# Patient Record
Sex: FEMALE | Race: WHITE | Employment: FULL TIME | URBAN - METROPOLITAN AREA
[De-identification: names, ages, dates, MRNs, and addresses within clinical notes are randomized per-mention and may not be internally consistent; named-entity substitution may affect disease eponyms.]

---

## 2023-09-06 LAB
HEP B, EXTERNAL RESULT: NEGATIVE
HIV, EXTERNAL RESULT: NORMAL
RUBELLA TITER, EXTERNAL RESULT: NORMAL

## 2024-01-26 LAB — T. PALLIDUM (SYPHILIS) ANTIBODY, EXTERNAL RESULT: NORMAL

## 2024-03-20 LAB — GBS, EXTERNAL RESULT: NEGATIVE

## 2024-04-07 ENCOUNTER — PREP FOR PROCEDURE (OUTPATIENT)
Dept: OBGYN | Age: 29
End: 2024-04-07

## 2024-04-07 RX ORDER — ONDANSETRON 2 MG/ML
4 INJECTION INTRAMUSCULAR; INTRAVENOUS EVERY 6 HOURS PRN
Status: CANCELLED | OUTPATIENT
Start: 2024-04-07

## 2024-04-07 RX ORDER — CARBOPROST TROMETHAMINE 250 UG/ML
250 INJECTION, SOLUTION INTRAMUSCULAR PRN
Status: CANCELLED | OUTPATIENT
Start: 2024-04-07

## 2024-04-07 RX ORDER — SODIUM CHLORIDE, SODIUM LACTATE, POTASSIUM CHLORIDE, AND CALCIUM CHLORIDE .6; .31; .03; .02 G/100ML; G/100ML; G/100ML; G/100ML
1000 INJECTION, SOLUTION INTRAVENOUS PRN
Status: CANCELLED | OUTPATIENT
Start: 2024-04-07

## 2024-04-07 RX ORDER — SODIUM CHLORIDE, SODIUM LACTATE, POTASSIUM CHLORIDE, AND CALCIUM CHLORIDE .6; .31; .03; .02 G/100ML; G/100ML; G/100ML; G/100ML
500 INJECTION, SOLUTION INTRAVENOUS PRN
Status: CANCELLED | OUTPATIENT
Start: 2024-04-07

## 2024-04-07 RX ORDER — ONDANSETRON 4 MG/1
4 TABLET, ORALLY DISINTEGRATING ORAL EVERY 6 HOURS PRN
Status: CANCELLED | OUTPATIENT
Start: 2024-04-07

## 2024-04-07 RX ORDER — SODIUM CHLORIDE, SODIUM LACTATE, POTASSIUM CHLORIDE, CALCIUM CHLORIDE 600; 310; 30; 20 MG/100ML; MG/100ML; MG/100ML; MG/100ML
INJECTION, SOLUTION INTRAVENOUS CONTINUOUS
Status: CANCELLED | OUTPATIENT
Start: 2024-04-07

## 2024-04-07 RX ORDER — METHYLERGONOVINE MALEATE 0.2 MG/ML
200 INJECTION INTRAVENOUS PRN
Status: CANCELLED | OUTPATIENT
Start: 2024-04-07

## 2024-04-07 RX ORDER — SODIUM CHLORIDE 0.9 % (FLUSH) 0.9 %
5-40 SYRINGE (ML) INJECTION EVERY 12 HOURS SCHEDULED
Status: CANCELLED | OUTPATIENT
Start: 2024-04-07

## 2024-04-07 RX ORDER — TERBUTALINE SULFATE 1 MG/ML
0.25 INJECTION, SOLUTION SUBCUTANEOUS
Status: CANCELLED | OUTPATIENT
Start: 2024-04-07 | End: 2024-04-08

## 2024-04-07 RX ORDER — SODIUM CHLORIDE 9 MG/ML
25 INJECTION, SOLUTION INTRAVENOUS PRN
Status: CANCELLED | OUTPATIENT
Start: 2024-04-07

## 2024-04-07 RX ORDER — SODIUM CHLORIDE 0.9 % (FLUSH) 0.9 %
5-40 SYRINGE (ML) INJECTION PRN
Status: CANCELLED | OUTPATIENT
Start: 2024-04-07

## 2024-04-07 RX ORDER — MISOPROSTOL 100 UG/1
400 TABLET ORAL PRN
Status: CANCELLED | OUTPATIENT
Start: 2024-04-07

## 2024-04-09 ENCOUNTER — ANESTHESIA (OUTPATIENT)
Dept: LABOR AND DELIVERY | Age: 29
End: 2024-04-09
Payer: COMMERCIAL

## 2024-04-09 ENCOUNTER — HOSPITAL ENCOUNTER (INPATIENT)
Age: 29
LOS: 4 days | Discharge: HOME | End: 2024-04-13
Attending: OBSTETRICS & GYNECOLOGY | Admitting: OBSTETRICS & GYNECOLOGY
Payer: COMMERCIAL

## 2024-04-09 ENCOUNTER — ANESTHESIA EVENT (OUTPATIENT)
Dept: LABOR AND DELIVERY | Age: 29
End: 2024-04-09
Payer: COMMERCIAL

## 2024-04-09 ENCOUNTER — APPOINTMENT (OUTPATIENT)
Dept: LABOR AND DELIVERY | Age: 29
End: 2024-04-09
Payer: COMMERCIAL

## 2024-04-09 DIAGNOSIS — G89.18 POST-OPERATIVE PAIN: Primary | ICD-10-CM

## 2024-04-09 PROBLEM — Z98.890 STATUS POST INDUCTION OF LABOR: Status: ACTIVE | Noted: 2024-04-09

## 2024-04-09 LAB
ABO + RH BLD: NORMAL
ABO, EXTERNAL RESULT: NORMAL
ALBUMIN SERPL-MCNC: 3.2 G/DL (ref 3.4–5)
ALBUMIN/GLOB SERPL: 1.4 {RATIO} (ref 1.1–2.2)
ALP SERPL-CCNC: 167 U/L (ref 40–129)
ALT SERPL-CCNC: 9 U/L (ref 10–40)
AMPHETAMINES UR QL SCN>1000 NG/ML: NORMAL
ANION GAP SERPL CALCULATED.3IONS-SCNC: 13 MMOL/L (ref 3–16)
APTT BLD: 25.1 SEC (ref 22.1–36.4)
AST SERPL-CCNC: 35 U/L (ref 15–37)
BACTERIA URNS QL MICRO: ABNORMAL /HPF
BARBITURATES UR QL SCN>200 NG/ML: NORMAL
BENZODIAZ UR QL SCN>200 NG/ML: NORMAL
BILIRUB SERPL-MCNC: <0.2 MG/DL (ref 0–1)
BILIRUB UR QL STRIP.AUTO: NEGATIVE
BLD GP AB SCN SERPL QL: NORMAL
BUN SERPL-MCNC: 9 MG/DL (ref 7–20)
BUPRENORPHINE+NOR UR QL SCN: NORMAL
CALCIUM SERPL-MCNC: 9.1 MG/DL (ref 8.3–10.6)
CANNABINOIDS UR QL SCN>50 NG/ML: NORMAL
CHLORIDE SERPL-SCNC: 103 MMOL/L (ref 99–110)
CLARITY UR: CLEAR
CO2 SERPL-SCNC: 18 MMOL/L (ref 21–32)
COCAINE UR QL SCN: NORMAL
COLOR UR: YELLOW
CREAT SERPL-MCNC: <0.5 MG/DL (ref 0.6–1.1)
DEPRECATED RDW RBC AUTO: 13.7 % (ref 12.4–15.4)
DRUG SCREEN COMMENT UR-IMP: NORMAL
EPI CELLS #/AREA URNS AUTO: 7 /HPF (ref 0–5)
FENTANYL SCREEN, URINE: NORMAL
FIBRINOGEN PPP-MCNC: 540 MG/DL (ref 227–534)
GFR SERPLBLD CREATININE-BSD FMLA CKD-EPI: >90 ML/MIN/{1.73_M2}
GLUCOSE SERPL-MCNC: 129 MG/DL (ref 70–99)
GLUCOSE UR STRIP.AUTO-MCNC: 100 MG/DL
HCT VFR BLD AUTO: 34.2 % (ref 36–48)
HGB BLD-MCNC: 11.8 G/DL (ref 12–16)
HGB UR QL STRIP.AUTO: NEGATIVE
HYALINE CASTS #/AREA URNS AUTO: 0 /LPF (ref 0–8)
INR PPP: 0.97 (ref 0.85–1.15)
KETONES UR STRIP.AUTO-MCNC: ABNORMAL MG/DL
LEUKOCYTE ESTERASE UR QL STRIP.AUTO: ABNORMAL
MCH RBC QN AUTO: 31.3 PG (ref 26–34)
MCHC RBC AUTO-ENTMCNC: 34.5 G/DL (ref 31–36)
MCV RBC AUTO: 90.7 FL (ref 80–100)
METHADONE UR QL SCN>300 NG/ML: NORMAL
NITRITE UR QL STRIP.AUTO: NEGATIVE
OPIATES UR QL SCN>300 NG/ML: NORMAL
OXYCODONE UR QL SCN: NORMAL
PCP UR QL SCN>25 NG/ML: NORMAL
PH UR STRIP.AUTO: 6.5 [PH] (ref 5–8)
PH UR STRIP: 6.5 [PH]
PLATELET # BLD AUTO: 197 K/UL (ref 135–450)
PMV BLD AUTO: 9.5 FL (ref 5–10.5)
POTASSIUM SERPL-SCNC: 5.1 MMOL/L (ref 3.5–5.1)
PROT SERPL-MCNC: 5.5 G/DL (ref 6.4–8.2)
PROT UR STRIP.AUTO-MCNC: ABNORMAL MG/DL
PROTHROMBIN TIME: 13.1 SEC (ref 11.9–14.9)
RBC # BLD AUTO: 3.77 M/UL (ref 4–5.2)
RBC CLUMPS #/AREA URNS AUTO: 2 /HPF (ref 0–4)
SODIUM SERPL-SCNC: 134 MMOL/L (ref 136–145)
SP GR UR STRIP.AUTO: 1.03 (ref 1–1.03)
UA DIPSTICK W REFLEX MICRO PNL UR: YES
URATE SERPL-MCNC: 4.9 MG/DL (ref 2.6–6)
URN SPEC COLLECT METH UR: ABNORMAL
UROBILINOGEN UR STRIP-ACNC: 1 E.U./DL
WBC # BLD AUTO: 9.4 K/UL (ref 4–11)
WBC #/AREA URNS AUTO: 5 /HPF (ref 0–5)

## 2024-04-09 PROCEDURE — 86780 TREPONEMA PALLIDUM: CPT

## 2024-04-09 PROCEDURE — 80307 DRUG TEST PRSMV CHEM ANLYZR: CPT

## 2024-04-09 PROCEDURE — 85730 THROMBOPLASTIN TIME PARTIAL: CPT

## 2024-04-09 PROCEDURE — 2580000003 HC RX 258: Performed by: OBSTETRICS & GYNECOLOGY

## 2024-04-09 PROCEDURE — 85384 FIBRINOGEN ACTIVITY: CPT

## 2024-04-09 PROCEDURE — 85027 COMPLETE CBC AUTOMATED: CPT

## 2024-04-09 PROCEDURE — 80053 COMPREHEN METABOLIC PANEL: CPT

## 2024-04-09 PROCEDURE — 1220000000 HC SEMI PRIVATE OB R&B

## 2024-04-09 PROCEDURE — 86850 RBC ANTIBODY SCREEN: CPT

## 2024-04-09 PROCEDURE — 6370000000 HC RX 637 (ALT 250 FOR IP): Performed by: OBSTETRICS & GYNECOLOGY

## 2024-04-09 PROCEDURE — 84550 ASSAY OF BLOOD/URIC ACID: CPT

## 2024-04-09 PROCEDURE — 59025 FETAL NON-STRESS TEST: CPT

## 2024-04-09 PROCEDURE — 86900 BLOOD TYPING SEROLOGIC ABO: CPT

## 2024-04-09 PROCEDURE — 86901 BLOOD TYPING SEROLOGIC RH(D): CPT

## 2024-04-09 PROCEDURE — 81001 URINALYSIS AUTO W/SCOPE: CPT

## 2024-04-09 PROCEDURE — 59200 INSERT CERVICAL DILATOR: CPT

## 2024-04-09 PROCEDURE — 85610 PROTHROMBIN TIME: CPT

## 2024-04-09 RX ORDER — SODIUM CHLORIDE 0.9 % (FLUSH) 0.9 %
5-40 SYRINGE (ML) INJECTION PRN
Status: DISCONTINUED | OUTPATIENT
Start: 2024-04-09 | End: 2024-04-13 | Stop reason: HOSPADM

## 2024-04-09 RX ORDER — METHYLERGONOVINE MALEATE 0.2 MG/ML
200 INJECTION INTRAVENOUS PRN
Status: DISCONTINUED | OUTPATIENT
Start: 2024-04-09 | End: 2024-04-13 | Stop reason: HOSPADM

## 2024-04-09 RX ORDER — ONDANSETRON 2 MG/ML
4 INJECTION INTRAMUSCULAR; INTRAVENOUS EVERY 6 HOURS PRN
Status: DISCONTINUED | OUTPATIENT
Start: 2024-04-09 | End: 2024-04-10 | Stop reason: ALTCHOICE

## 2024-04-09 RX ORDER — SODIUM CHLORIDE 9 MG/ML
25 INJECTION, SOLUTION INTRAVENOUS PRN
Status: DISCONTINUED | OUTPATIENT
Start: 2024-04-09 | End: 2024-04-13 | Stop reason: HOSPADM

## 2024-04-09 RX ORDER — SODIUM CHLORIDE, SODIUM LACTATE, POTASSIUM CHLORIDE, AND CALCIUM CHLORIDE .6; .31; .03; .02 G/100ML; G/100ML; G/100ML; G/100ML
1000 INJECTION, SOLUTION INTRAVENOUS PRN
Status: DISCONTINUED | OUTPATIENT
Start: 2024-04-09 | End: 2024-04-13 | Stop reason: HOSPADM

## 2024-04-09 RX ORDER — CARBOPROST TROMETHAMINE 250 UG/ML
250 INJECTION, SOLUTION INTRAMUSCULAR PRN
Status: DISCONTINUED | OUTPATIENT
Start: 2024-04-09 | End: 2024-04-13 | Stop reason: HOSPADM

## 2024-04-09 RX ORDER — TERBUTALINE SULFATE 1 MG/ML
0.25 INJECTION, SOLUTION SUBCUTANEOUS
Status: ACTIVE | OUTPATIENT
Start: 2024-04-09 | End: 2024-04-10

## 2024-04-09 RX ORDER — SODIUM CHLORIDE 0.9 % (FLUSH) 0.9 %
5-40 SYRINGE (ML) INJECTION EVERY 12 HOURS SCHEDULED
Status: DISCONTINUED | OUTPATIENT
Start: 2024-04-09 | End: 2024-04-13 | Stop reason: HOSPADM

## 2024-04-09 RX ORDER — ONDANSETRON 4 MG/1
4 TABLET, ORALLY DISINTEGRATING ORAL EVERY 6 HOURS PRN
Status: DISCONTINUED | OUTPATIENT
Start: 2024-04-09 | End: 2024-04-10 | Stop reason: ALTCHOICE

## 2024-04-09 RX ORDER — MISOPROSTOL 200 UG/1
400 TABLET ORAL PRN
Status: DISCONTINUED | OUTPATIENT
Start: 2024-04-09 | End: 2024-04-13 | Stop reason: HOSPADM

## 2024-04-09 RX ORDER — SODIUM CHLORIDE, SODIUM LACTATE, POTASSIUM CHLORIDE, AND CALCIUM CHLORIDE .6; .31; .03; .02 G/100ML; G/100ML; G/100ML; G/100ML
500 INJECTION, SOLUTION INTRAVENOUS PRN
Status: DISCONTINUED | OUTPATIENT
Start: 2024-04-09 | End: 2024-04-13 | Stop reason: HOSPADM

## 2024-04-09 RX ORDER — SODIUM CHLORIDE, SODIUM LACTATE, POTASSIUM CHLORIDE, CALCIUM CHLORIDE 600; 310; 30; 20 MG/100ML; MG/100ML; MG/100ML; MG/100ML
INJECTION, SOLUTION INTRAVENOUS CONTINUOUS
Status: DISCONTINUED | OUTPATIENT
Start: 2024-04-09 | End: 2024-04-10 | Stop reason: SDUPTHER

## 2024-04-09 RX ADMIN — Medication 25 MCG: at 20:05

## 2024-04-09 RX ADMIN — SODIUM CHLORIDE, POTASSIUM CHLORIDE, SODIUM LACTATE AND CALCIUM CHLORIDE: 600; 310; 30; 20 INJECTION, SOLUTION INTRAVENOUS at 19:38

## 2024-04-10 LAB — REAGIN+T PALLIDUM IGG+IGM SERPL-IMP: NORMAL

## 2024-04-10 PROCEDURE — 2500000003 HC RX 250 WO HCPCS: Performed by: NURSE ANESTHETIST, CERTIFIED REGISTERED

## 2024-04-10 PROCEDURE — 2709999900 HC NON-CHARGEABLE SUPPLY: Performed by: OBSTETRICS & GYNECOLOGY

## 2024-04-10 PROCEDURE — 7100000000 HC PACU RECOVERY - FIRST 15 MIN: Performed by: OBSTETRICS & GYNECOLOGY

## 2024-04-10 PROCEDURE — 59200 INSERT CERVICAL DILATOR: CPT

## 2024-04-10 PROCEDURE — 3700000000 HC ANESTHESIA ATTENDED CARE: Performed by: OBSTETRICS & GYNECOLOGY

## 2024-04-10 PROCEDURE — 7100000001 HC PACU RECOVERY - ADDTL 15 MIN: Performed by: OBSTETRICS & GYNECOLOGY

## 2024-04-10 PROCEDURE — 3700000001 HC ADD 15 MINUTES (ANESTHESIA): Performed by: OBSTETRICS & GYNECOLOGY

## 2024-04-10 PROCEDURE — 6370000000 HC RX 637 (ALT 250 FOR IP)

## 2024-04-10 PROCEDURE — 3700000025 EPIDURAL BLOCK: Performed by: ANESTHESIOLOGY

## 2024-04-10 PROCEDURE — 6360000002 HC RX W HCPCS: Performed by: NURSE ANESTHETIST, CERTIFIED REGISTERED

## 2024-04-10 PROCEDURE — 51701 INSERT BLADDER CATHETER: CPT

## 2024-04-10 PROCEDURE — 6360000002 HC RX W HCPCS

## 2024-04-10 PROCEDURE — 2580000003 HC RX 258: Performed by: NURSE ANESTHETIST, CERTIFIED REGISTERED

## 2024-04-10 PROCEDURE — 3E033VJ INTRODUCTION OF OTHER HORMONE INTO PERIPHERAL VEIN, PERCUTANEOUS APPROACH: ICD-10-PCS | Performed by: OBSTETRICS & GYNECOLOGY

## 2024-04-10 PROCEDURE — 2580000003 HC RX 258: Performed by: OBSTETRICS & GYNECOLOGY

## 2024-04-10 PROCEDURE — 51702 INSERT TEMP BLADDER CATH: CPT

## 2024-04-10 PROCEDURE — 3E0P7VZ INTRODUCTION OF HORMONE INTO FEMALE REPRODUCTIVE, VIA NATURAL OR ARTIFICIAL OPENING: ICD-10-PCS | Performed by: OBSTETRICS & GYNECOLOGY

## 2024-04-10 PROCEDURE — 6360000002 HC RX W HCPCS: Performed by: OBSTETRICS & GYNECOLOGY

## 2024-04-10 PROCEDURE — 2500000003 HC RX 250 WO HCPCS: Performed by: OBSTETRICS & GYNECOLOGY

## 2024-04-10 PROCEDURE — 3609079900 HC CESAREAN SECTION: Performed by: OBSTETRICS & GYNECOLOGY

## 2024-04-10 PROCEDURE — 1220000000 HC SEMI PRIVATE OB R&B

## 2024-04-10 PROCEDURE — 6370000000 HC RX 637 (ALT 250 FOR IP): Performed by: OBSTETRICS & GYNECOLOGY

## 2024-04-10 PROCEDURE — 2500000003 HC RX 250 WO HCPCS

## 2024-04-10 RX ORDER — MORPHINE SULFATE 1 MG/ML
INJECTION, SOLUTION EPIDURAL; INTRATHECAL; INTRAVENOUS PRN
Status: DISCONTINUED | OUTPATIENT
Start: 2024-04-10 | End: 2024-04-10 | Stop reason: SDUPTHER

## 2024-04-10 RX ORDER — ONDANSETRON 2 MG/ML
INJECTION INTRAMUSCULAR; INTRAVENOUS PRN
Status: DISCONTINUED | OUTPATIENT
Start: 2024-04-10 | End: 2024-04-10 | Stop reason: SDUPTHER

## 2024-04-10 RX ORDER — ACETAMINOPHEN 500 MG
1000 TABLET ORAL EVERY 8 HOURS PRN
Status: DISCONTINUED | OUTPATIENT
Start: 2024-04-10 | End: 2024-04-11

## 2024-04-10 RX ORDER — SODIUM CHLORIDE 0.9 % (FLUSH) 0.9 %
5-40 SYRINGE (ML) INJECTION PRN
Status: DISCONTINUED | OUTPATIENT
Start: 2024-04-10 | End: 2024-04-13 | Stop reason: HOSPADM

## 2024-04-10 RX ORDER — LIDOCAINE HYDROCHLORIDE AND EPINEPHRINE 15; 5 MG/ML; UG/ML
INJECTION, SOLUTION EPIDURAL PRN
Status: DISCONTINUED | OUTPATIENT
Start: 2024-04-10 | End: 2024-04-10 | Stop reason: SDUPTHER

## 2024-04-10 RX ORDER — OXYTOCIN 10 [USP'U]/ML
INJECTION, SOLUTION INTRAMUSCULAR; INTRAVENOUS PRN
Status: DISCONTINUED | OUTPATIENT
Start: 2024-04-10 | End: 2024-04-10 | Stop reason: SDUPTHER

## 2024-04-10 RX ORDER — ONDANSETRON 2 MG/ML
4 INJECTION INTRAMUSCULAR; INTRAVENOUS EVERY 6 HOURS PRN
Status: DISCONTINUED | OUTPATIENT
Start: 2024-04-10 | End: 2024-04-10 | Stop reason: SDUPTHER

## 2024-04-10 RX ORDER — BUPIVACAINE HYDROCHLORIDE 2.5 MG/ML
INJECTION, SOLUTION EPIDURAL; INFILTRATION; INTRACAUDAL PRN
Status: DISCONTINUED | OUTPATIENT
Start: 2024-04-10 | End: 2024-04-10 | Stop reason: SDUPTHER

## 2024-04-10 RX ORDER — CITRIC ACID/SODIUM CITRATE 334-500MG
SOLUTION, ORAL ORAL
Status: COMPLETED
Start: 2024-04-10 | End: 2024-04-10

## 2024-04-10 RX ORDER — MIDAZOLAM HYDROCHLORIDE 1 MG/ML
INJECTION INTRAMUSCULAR; INTRAVENOUS PRN
Status: DISCONTINUED | OUTPATIENT
Start: 2024-04-10 | End: 2024-04-10 | Stop reason: SDUPTHER

## 2024-04-10 RX ORDER — DIPHENHYDRAMINE HCL 25 MG
25 TABLET ORAL EVERY 6 HOURS PRN
Status: ACTIVE | OUTPATIENT
Start: 2024-04-10 | End: 2024-04-11

## 2024-04-10 RX ORDER — SODIUM CHLORIDE, SODIUM LACTATE, POTASSIUM CHLORIDE, CALCIUM CHLORIDE 600; 310; 30; 20 MG/100ML; MG/100ML; MG/100ML; MG/100ML
INJECTION, SOLUTION INTRAVENOUS CONTINUOUS PRN
Status: DISCONTINUED | OUTPATIENT
Start: 2024-04-10 | End: 2024-04-10 | Stop reason: SDUPTHER

## 2024-04-10 RX ORDER — ACETAMINOPHEN 500 MG
TABLET ORAL
Status: COMPLETED
Start: 2024-04-10 | End: 2024-04-10

## 2024-04-10 RX ORDER — ONDANSETRON 2 MG/ML
4 INJECTION INTRAMUSCULAR; INTRAVENOUS EVERY 6 HOURS PRN
Status: DISCONTINUED | OUTPATIENT
Start: 2024-04-10 | End: 2024-04-10

## 2024-04-10 RX ORDER — SODIUM CHLORIDE 0.9 % (FLUSH) 0.9 %
5-40 SYRINGE (ML) INJECTION EVERY 12 HOURS SCHEDULED
Status: DISCONTINUED | OUTPATIENT
Start: 2024-04-10 | End: 2024-04-13 | Stop reason: HOSPADM

## 2024-04-10 RX ORDER — DIPHENHYDRAMINE HYDROCHLORIDE 50 MG/ML
25 INJECTION INTRAMUSCULAR; INTRAVENOUS EVERY 6 HOURS PRN
Status: ACTIVE | OUTPATIENT
Start: 2024-04-10 | End: 2024-04-11

## 2024-04-10 RX ORDER — KETOROLAC TROMETHAMINE 30 MG/ML
30 INJECTION, SOLUTION INTRAMUSCULAR; INTRAVENOUS EVERY 6 HOURS
Status: DISCONTINUED | OUTPATIENT
Start: 2024-04-11 | End: 2024-04-10 | Stop reason: ALTCHOICE

## 2024-04-10 RX ORDER — LANOLIN 100 %
OINTMENT (GRAM) TOPICAL
Status: DISCONTINUED | OUTPATIENT
Start: 2024-04-10 | End: 2024-04-13 | Stop reason: HOSPADM

## 2024-04-10 RX ORDER — ONDANSETRON 2 MG/ML
4 INJECTION INTRAMUSCULAR; INTRAVENOUS EVERY 6 HOURS PRN
Status: DISCONTINUED | OUTPATIENT
Start: 2024-04-10 | End: 2024-04-13 | Stop reason: HOSPADM

## 2024-04-10 RX ORDER — LIDOCAINE HCL/EPINEPHRINE/PF 2%-1:200K
VIAL (ML) INJECTION PRN
Status: DISCONTINUED | OUTPATIENT
Start: 2024-04-10 | End: 2024-04-10 | Stop reason: SDUPTHER

## 2024-04-10 RX ORDER — SODIUM CHLORIDE 9 MG/ML
INJECTION, SOLUTION INTRAVENOUS PRN
Status: DISCONTINUED | OUTPATIENT
Start: 2024-04-10 | End: 2024-04-13 | Stop reason: HOSPADM

## 2024-04-10 RX ORDER — SODIUM CHLORIDE, SODIUM LACTATE, POTASSIUM CHLORIDE, CALCIUM CHLORIDE 600; 310; 30; 20 MG/100ML; MG/100ML; MG/100ML; MG/100ML
INJECTION, SOLUTION INTRAVENOUS CONTINUOUS
Status: DISCONTINUED | OUTPATIENT
Start: 2024-04-10 | End: 2024-04-13 | Stop reason: HOSPADM

## 2024-04-10 RX ORDER — NALOXONE HYDROCHLORIDE 0.4 MG/ML
INJECTION, SOLUTION INTRAMUSCULAR; INTRAVENOUS; SUBCUTANEOUS PRN
Status: ACTIVE | OUTPATIENT
Start: 2024-04-10 | End: 2024-04-11

## 2024-04-10 RX ORDER — IBUPROFEN 800 MG/1
800 TABLET ORAL EVERY 8 HOURS
Status: DISCONTINUED | OUTPATIENT
Start: 2024-04-12 | End: 2024-04-11

## 2024-04-10 RX ORDER — CITRIC ACID/SODIUM CITRATE 334-500MG
30 SOLUTION, ORAL ORAL ONCE
Status: COMPLETED | OUTPATIENT
Start: 2024-04-10 | End: 2024-04-10

## 2024-04-10 RX ORDER — LIDOCAINE HYDROCHLORIDE 10 MG/ML
INJECTION, SOLUTION INFILTRATION; PERINEURAL PRN
Status: DISCONTINUED | OUTPATIENT
Start: 2024-04-10 | End: 2024-04-10 | Stop reason: SDUPTHER

## 2024-04-10 RX ORDER — METOCLOPRAMIDE HYDROCHLORIDE 5 MG/ML
10 INJECTION INTRAMUSCULAR; INTRAVENOUS ONCE
Status: COMPLETED | OUTPATIENT
Start: 2024-04-10 | End: 2024-04-10

## 2024-04-10 RX ORDER — IBUPROFEN 800 MG/1
800 TABLET ORAL EVERY 8 HOURS
Status: DISCONTINUED | OUTPATIENT
Start: 2024-04-12 | End: 2024-04-10

## 2024-04-10 RX ORDER — OXYCODONE HYDROCHLORIDE 5 MG/1
5 TABLET ORAL EVERY 4 HOURS PRN
Status: DISCONTINUED | OUTPATIENT
Start: 2024-04-10 | End: 2024-04-13 | Stop reason: HOSPADM

## 2024-04-10 RX ORDER — ONDANSETRON 4 MG/1
4 TABLET, ORALLY DISINTEGRATING ORAL EVERY 8 HOURS PRN
Status: DISCONTINUED | OUTPATIENT
Start: 2024-04-10 | End: 2024-04-13 | Stop reason: HOSPADM

## 2024-04-10 RX ORDER — KETOROLAC TROMETHAMINE 30 MG/ML
30 INJECTION, SOLUTION INTRAMUSCULAR; INTRAVENOUS EVERY 6 HOURS
Status: DISCONTINUED | OUTPATIENT
Start: 2024-04-10 | End: 2024-04-10

## 2024-04-10 RX ORDER — ACETAMINOPHEN 500 MG
1000 TABLET ORAL ONCE
Status: COMPLETED | OUTPATIENT
Start: 2024-04-10 | End: 2024-04-10

## 2024-04-10 RX ORDER — FENTANYL CITRATE 50 UG/ML
INJECTION, SOLUTION INTRAMUSCULAR; INTRAVENOUS PRN
Status: DISCONTINUED | OUTPATIENT
Start: 2024-04-10 | End: 2024-04-10 | Stop reason: SDUPTHER

## 2024-04-10 RX ORDER — METOCLOPRAMIDE HYDROCHLORIDE 5 MG/ML
INJECTION INTRAMUSCULAR; INTRAVENOUS
Status: COMPLETED
Start: 2024-04-10 | End: 2024-04-10

## 2024-04-10 RX ORDER — FAMOTIDINE 10 MG/ML
INJECTION, SOLUTION INTRAVENOUS
Status: DISPENSED
Start: 2024-04-10 | End: 2024-04-11

## 2024-04-10 RX ORDER — KETOROLAC TROMETHAMINE 30 MG/ML
30 INJECTION, SOLUTION INTRAMUSCULAR; INTRAVENOUS EVERY 6 HOURS
Status: DISCONTINUED | OUTPATIENT
Start: 2024-04-11 | End: 2024-04-11

## 2024-04-10 RX ORDER — NALOXONE HYDROCHLORIDE 0.4 MG/ML
INJECTION, SOLUTION INTRAMUSCULAR; INTRAVENOUS; SUBCUTANEOUS PRN
Status: DISCONTINUED | OUTPATIENT
Start: 2024-04-10 | End: 2024-04-10 | Stop reason: ALTCHOICE

## 2024-04-10 RX ORDER — DOCUSATE SODIUM 100 MG/1
100 CAPSULE, LIQUID FILLED ORAL 2 TIMES DAILY
Status: DISCONTINUED | OUTPATIENT
Start: 2024-04-10 | End: 2024-04-13 | Stop reason: HOSPADM

## 2024-04-10 RX ORDER — SIMETHICONE 80 MG
80 TABLET,CHEWABLE ORAL EVERY 6 HOURS PRN
Status: DISCONTINUED | OUTPATIENT
Start: 2024-04-10 | End: 2024-04-13 | Stop reason: HOSPADM

## 2024-04-10 RX ADMIN — OXYTOCIN 3 ML: 10 INJECTION INTRAVENOUS at 17:55

## 2024-04-10 RX ADMIN — ONDANSETRON 4 MG: 2 INJECTION INTRAMUSCULAR; INTRAVENOUS at 17:40

## 2024-04-10 RX ADMIN — SODIUM CHLORIDE, POTASSIUM CHLORIDE, SODIUM LACTATE AND CALCIUM CHLORIDE: 600; 310; 30; 20 INJECTION, SOLUTION INTRAVENOUS at 15:50

## 2024-04-10 RX ADMIN — LIDOCAINE HYDROCHLORIDE,EPINEPHRINE BITARTRATE 3 ML: 20; .005 INJECTION, SOLUTION EPIDURAL; INFILTRATION; INTRACAUDAL; PERINEURAL at 17:40

## 2024-04-10 RX ADMIN — Medication 87.3 MILLI-UNITS/MIN: at 18:07

## 2024-04-10 RX ADMIN — LIDOCAINE HYDROCHLORIDE 3 ML: 10 INJECTION, SOLUTION INFILTRATION; PERINEURAL at 07:53

## 2024-04-10 RX ADMIN — Medication 1000 MG: at 17:01

## 2024-04-10 RX ADMIN — BUPIVACAINE HYDROCHLORIDE 5 ML: 2.5 INJECTION, SOLUTION EPIDURAL; INFILTRATION; INTRACAUDAL at 08:00

## 2024-04-10 RX ADMIN — Medication 25 MCG: at 04:50

## 2024-04-10 RX ADMIN — MORPHINE SULFATE 3 MG: 1 INJECTION, SOLUTION EPIDURAL; INTRATHECAL; INTRAVENOUS at 17:56

## 2024-04-10 RX ADMIN — Medication 25 MCG: at 00:15

## 2024-04-10 RX ADMIN — SODIUM CHLORIDE, POTASSIUM CHLORIDE, SODIUM LACTATE AND CALCIUM CHLORIDE: 600; 310; 30; 20 INJECTION, SOLUTION INTRAVENOUS at 18:07

## 2024-04-10 RX ADMIN — SODIUM CITRATE AND CITRIC ACID MONOHYDRATE 30 ML: 500; 334 SOLUTION ORAL at 17:01

## 2024-04-10 RX ADMIN — ACETAMINOPHEN 1000 MG: 500 TABLET ORAL at 17:01

## 2024-04-10 RX ADMIN — BUPIVACAINE HYDROCHLORIDE 3 ML: 2.5 INJECTION, SOLUTION EPIDURAL; INFILTRATION; INTRACAUDAL at 08:04

## 2024-04-10 RX ADMIN — LIDOCAINE HYDROCHLORIDE AND EPINEPHRINE 3 ML: 15; 5 INJECTION, SOLUTION EPIDURAL at 07:56

## 2024-04-10 RX ADMIN — BUPIVACAINE HYDROCHLORIDE 2 ML: 2.5 INJECTION, SOLUTION EPIDURAL; INFILTRATION; INTRACAUDAL at 08:08

## 2024-04-10 RX ADMIN — Medication 30 ML: at 17:01

## 2024-04-10 RX ADMIN — SODIUM CHLORIDE, SODIUM LACTATE, POTASSIUM CHLORIDE, AND CALCIUM CHLORIDE: .6; .31; .03; .02 INJECTION, SOLUTION INTRAVENOUS at 17:29

## 2024-04-10 RX ADMIN — FENTANYL CITRATE 50 MCG: 50 INJECTION INTRAMUSCULAR; INTRAVENOUS at 17:42

## 2024-04-10 RX ADMIN — METOCLOPRAMIDE 10 MG: 5 INJECTION, SOLUTION INTRAMUSCULAR; INTRAVENOUS at 17:02

## 2024-04-10 RX ADMIN — METOCLOPRAMIDE HYDROCHLORIDE 10 MG: 5 INJECTION INTRAMUSCULAR; INTRAVENOUS at 17:02

## 2024-04-10 RX ADMIN — SODIUM CHLORIDE 2000 MG: 900 INJECTION INTRAVENOUS at 17:02

## 2024-04-10 RX ADMIN — OXYTOCIN 20 ML: 10 INJECTION INTRAVENOUS at 17:28

## 2024-04-10 RX ADMIN — MIDAZOLAM 2 MG: 1 INJECTION INTRAMUSCULAR; INTRAVENOUS at 17:42

## 2024-04-10 RX ADMIN — Medication 20 MG: at 16:58

## 2024-04-10 RX ADMIN — LIDOCAINE HYDROCHLORIDE,EPINEPHRINE BITARTRATE 5 ML: 20; .005 INJECTION, SOLUTION EPIDURAL; INFILTRATION; INTRACAUDAL; PERINEURAL at 16:50

## 2024-04-10 RX ADMIN — LIDOCAINE HYDROCHLORIDE,EPINEPHRINE BITARTRATE 5 ML: 20; .005 INJECTION, SOLUTION EPIDURAL; INFILTRATION; INTRACAUDAL; PERINEURAL at 17:05

## 2024-04-10 RX ADMIN — LIDOCAINE HYDROCHLORIDE,EPINEPHRINE BITARTRATE 3 ML: 20; .005 INJECTION, SOLUTION EPIDURAL; INFILTRATION; INTRACAUDAL; PERINEURAL at 14:40

## 2024-04-10 RX ADMIN — Medication 1 MILLI-UNITS/MIN: at 08:51

## 2024-04-10 RX ADMIN — SODIUM CHLORIDE, POTASSIUM CHLORIDE, SODIUM LACTATE AND CALCIUM CHLORIDE: 600; 310; 30; 20 INJECTION, SOLUTION INTRAVENOUS at 09:15

## 2024-04-10 RX ADMIN — ONDANSETRON 4 MG: 2 INJECTION INTRAMUSCULAR; INTRAVENOUS at 17:05

## 2024-04-10 RX ADMIN — KETOROLAC TROMETHAMINE 30 MG: 30 INJECTION, SOLUTION INTRAMUSCULAR at 23:51

## 2024-04-10 ASSESSMENT — PAIN DESCRIPTION - LOCATION: LOCATION: ABDOMEN

## 2024-04-10 ASSESSMENT — PAIN SCALES - GENERAL
PAINLEVEL_OUTOF10: 0
PAINLEVEL_OUTOF10: 3

## 2024-04-10 ASSESSMENT — PAIN - FUNCTIONAL ASSESSMENT: PAIN_FUNCTIONAL_ASSESSMENT: ACTIVITIES ARE NOT PREVENTED

## 2024-04-10 ASSESSMENT — PAIN DESCRIPTION - DESCRIPTORS: DESCRIPTORS: SORE

## 2024-04-10 ASSESSMENT — PAIN DESCRIPTION - ORIENTATION: ORIENTATION: LOWER

## 2024-04-10 NOTE — FLOWSHEET NOTE
RN discussed placement of IUPC and FSE with Dr. Mena. MD to bedside. SVE performed and cervix noted to be 8/90/0. FSE and IUPC placed. RN notes FSE not functioning appropriately. FHR confirmed with external US at 1329 and noted to be at a baseline of ~130 BPM. FSE noted to be dislodged and was removed at 1333 and external US adjusted. External US noted to be tracing FHR appropriately at 1340 FHR baseline ~140 BPM.

## 2024-04-10 NOTE — H&P
Department of Obstetrics and Gynecology   Obstetrics History and Physical        CHIEF COMPLAINT:  induction of labor    HISTORY OF PRESENT ILLNESS:    Dara Medina  is a 28 y.o.  female at 39w3d is being admitted for induction    Estimated Due Date: Estimated Date of Delivery: 24    PRENATAL CARE: Complicated by: none    PAST OB HISTORY:  OB History          1    Para        Term                AB        Living             SAB        IAB        Ectopic        Molar        Multiple        Live Births                  Past Medical History:    History reviewed. No pertinent past medical history.  Past Surgical History:        Procedure Laterality Date    WISDOM TOOTH EXTRACTION       Allergies:  Patient has no known allergies.  Social History:    Social History     Socioeconomic History    Marital status:      Spouse name: Not on file    Number of children: Not on file    Years of education: Not on file    Highest education level: Not on file   Occupational History    Not on file   Tobacco Use    Smoking status: Never     Passive exposure: Never    Smokeless tobacco: Never   Vaping Use    Vaping Use: Not on file   Substance and Sexual Activity    Alcohol use: Not Currently    Drug use: Never    Sexual activity: Yes   Other Topics Concern    Not on file   Social History Narrative    Not on file     Social Determinants of Health     Financial Resource Strain: Not on file   Food Insecurity: No Food Insecurity (2024)    Hunger Vital Sign     Worried About Running Out of Food in the Last Year: Never true     Ran Out of Food in the Last Year: Never true   Transportation Needs: No Transportation Needs (2024)    PRAPARE - Transportation     Lack of Transportation (Medical): No     Lack of Transportation (Non-Medical): No   Physical Activity: Not on file   Stress: Not on file   Social Connections: Not on file   Intimate Partner Violence: Not on file   Housing Stability: Low Risk   proceed. The surgical site was confirmed by the patient and me.      Electronically signed by Nupur Mena MD on 4/10/2024 at 8:01 AM

## 2024-04-10 NOTE — FLOWSHEET NOTE
RN at bedside. SVE performed. FT/40. Pt tolerated well. Plan of care discussed w/ pt & FOB. Cytotec medication education given. All questions answered at this time. Pt up and ambulatory to bathroom w/o difficulty & back to bed. Cytotec placed 2005. Pt tolerated well. Call light and bedside table remain within reach.

## 2024-04-10 NOTE — ANESTHESIA PRE PROCEDURE
CMP: No results found for: \"NA\", \"K\", \"CL\", \"CO2\", \"BUN\", \"CREATININE\", \"GFRAA\", \"AGRATIO\", \"LABGLOM\", \"GLUCOSE\", \"GLU\", \"PROT\", \"CALCIUM\", \"BILITOT\", \"ALKPHOS\", \"AST\", \"ALT\"    POC Tests: No results for input(s): \"POCGLU\", \"POCNA\", \"POCK\", \"POCCL\", \"POCBUN\", \"POCHEMO\", \"POCHCT\" in the last 72 hours.    Coags:   Lab Results   Component Value Date/Time    PROTIME 13.1 04/09/2024 07:25 PM    INR 0.97 04/09/2024 07:25 PM    APTT 25.1 04/09/2024 07:25 PM       HCG (If Applicable): No results found for: \"PREGTESTUR\", \"PREGSERUM\", \"HCG\", \"HCGQUANT\"     ABGs: No results found for: \"PHART\", \"PO2ART\", \"PDX5LMS\", \"GCB0KET\", \"BEART\", \"T6CIZOTL\"     Type & Screen (If Applicable):  No results found for: \"LABABO\", \"LABRH\"    Drug/Infectious Status (If Applicable):  No results found for: \"HIV\", \"HEPCAB\"    COVID-19 Screening (If Applicable): No results found for: \"COVID19\"        Anesthesia Evaluation  Patient summary reviewed   no history of anesthetic complications:   Airway: Mallampati: I  TM distance: >3 FB   Neck ROM: full  Mouth opening: > = 3 FB   Dental: normal exam         Pulmonary:Negative Pulmonary ROS and normal exam                               Cardiovascular:Negative CV ROS  Exercise tolerance: good (>4 METS)          Rhythm: regular  Rate: normal           Beta Blocker:  Not on Beta Blocker         Neuro/Psych:   Negative Neuro/Psych ROS              GI/Hepatic/Renal: Neg GI/Hepatic/Renal ROS            Endo/Other: Negative Endo/Other ROS                    Abdominal:             Vascular: negative vascular ROS.         Other Findings:             Anesthesia Plan      epidural     ASA 2             Anesthetic plan and risks discussed with patient.    Use of blood products discussed with patient whom consented to blood products.                      ALESSANDRO EUGENE, APRN - CRNA   4/9/2024

## 2024-04-10 NOTE — FLOWSHEET NOTE
RN called [facility]. CRNA reported pt can have Toradol for pain management at 0000  Pt updated plan of care and agreeable.   RN remains in PACU w/ MOB

## 2024-04-10 NOTE — OP NOTE
Operative Note      Patient: Dara Medina  YOB: 1995  MRN: [id number]    Date of Procedure: 4/10/2024    Preop Diagnosis: 39 weeks, arrest of descent    Post-Op Diagnosis: Same       Procedure(s):  PRIMARY  SECTION WITH LOW TRANSVERSE UTERINE INCISION @ 1727    Surgeon(s):  Nupur Mena MD    Assistant:   * No surgical staff found *    Anesthesia: Epidural    Estimated Blood Loss (mL): 737    Complications: None    Specimens:   * No specimens in log *    Implants:  * No implants in log *      Drains:   Urinary Catheter 04/10/24 Vincent (Active)       Findings:  Infection Present At Time Of Surgery (PATOS) (choose all levels that have infection present):  No infection present  Other Findings: viable male, 8#3oz,     Detailed Description of Procedure:     Indications: Patient is a 28 y.o.  female at 39w3d who presented for IOL. Cytotec, pitocin. Progressed to complete. No descent past 0 station. Decels in fetal heart tracing.    The risks, benefits and alternatives of  section were discussed with the patient, including but not limited to infection, allergic reaction, disfiguring scar, thrombosis, injury to bowel, bladder, fistula, injury to blood vessels, hysterectomy, injury to fetus, need for blood transfusion which carries risk for HIV or hepatitis, pelvic pain, adhesive disease or scar tissue, embolism, herniation of incision site, and risk of repeat cesareans / trial of labor after . Patient elected to proceed, informed consent was signed.    Procedure Details:  The patient was taken to the operating room, placed in the supine position with the leftward tilt. Epidural anesthesia was found to be adequate. Vincent catheter was in place from labor. The patient was prepped and draped in the normal sterile fashion. Time out was performed, identifying correct patients name, date of birth, and type of the procedure.      Pfannenstiel skin incision was made with the  and the patient was taken to recovery in stable condition.      Electronically signed by Nupur Mena MD on 4/10/2024 at 6:02 PM

## 2024-04-10 NOTE — FLOWSHEET NOTE
Call placed to make Dr. Mena aware for Pt request for epidural at this time. Per MD Pt may have epidural.

## 2024-04-10 NOTE — FLOWSHEET NOTE
04/10/24 1248   Fetal Heart Rate   Interventions RN at Bedside;Provider at Bedside;Sterile Vaginal Exam Performed   Cervical Exam   Dilation (cm) 7   Effacement 80   Cervical Characteristics Mid-Position   Station -1   Station (Labor Curve Graph) 6   Presentation Vertex   OB Examiner Dr. Trina Mena aware of decelerations in FHR. RN repositioned Pt and IV fluid bolus administered. SVE performed per Dr. Mena and MD monitored fetal monitoring strip at bedside during that time. No changes to plan of care at this time.

## 2024-04-10 NOTE — FLOWSHEET NOTE
Pt admitted to [facility] for IOL. Pt and family oriented to room, call light, and binder. Whiteboard updated, gown and urine collection container given. Pt aware of urine drug testing and signed written consent. EFM applied with consent to central monitor bank with alarms on. Uterus soft and non-tender. Pt reports fetal movement. Pt denies vaginal leakage of fluid or bleeding. IV started, infusing w/o difficulty, labs sent. Admission history obtained and appropriate consents reviewed and signed. Call light and bedside table placed within reach. Instructed to call with any questions, needs, or concerns.

## 2024-04-10 NOTE — ANESTHESIA PROCEDURE NOTES
Epidural Block    Patient location during procedure: OB  Start time: 4/10/2024 7:56 AM  End time: 4/10/2024 8:10 AM  Reason for block: labor epidural  Staffing  Resident/CRNA: Edward Du APRN - CRNA  Performed by: Edward Du APRN - CRNA  Authorized by: Yogi Negro MD    Epidural  Patient position: sitting  Prep: ChloraPrep and site prepped and draped  Patient monitoring: continuous pulse ox and frequent blood pressure checks  Approach: midline  Location: L3-4  Guidance: paresthesia technique  Provider prep: mask and sterile gloves  Needle  Needle type: Tuohy   Needle gauge: 17 G  Needle length: 3.5 in  Needle insertion depth: 5 cm  Catheter type: side hole  Catheter size: 19 G  Catheter at skin depth: 11 cm  Test dose: negativeCatheter Secured: tegaderm and tape  Assessment  Sensory level: T8  Hemodynamics: stable  Attempts: 1  Outcomes: uncomplicated and patient tolerated procedure well  Preanesthetic Checklist  Completed: patient identified, IV checked, site marked, risks and benefits discussed, surgical/procedural consents, equipment checked, pre-op evaluation, timeout performed, anesthesia consent given, oxygen available, monitors applied/VS acknowledged, fire risk safety assessment completed and verbalized and blood product R/B/A discussed and consented

## 2024-04-10 NOTE — FLOWSHEET NOTE
Cytotec tubed up to unit. RN at bedside. 3rd Cytotec placed. Pt tolerated well. All questions answered at this time. Call light and bedside table remain within reach. Pt w/o needs at this time.

## 2024-04-10 NOTE — PLAN OF CARE
Problem: Vaginal Birth or  Section  Goal: Fetal and maternal status remain reassuring during the birth process  Description:  Birth OB-Pregnancy care plan goal which identifies if the fetal and maternal status remain reassuring during the birth process  Outcome: Progressing     Problem: Pain  Goal: Verbalizes/displays adequate comfort level or baseline comfort level  Recent Flowsheet Documentation  Taken 2024 2030 by Victorina Pike RN  Verbalizes/displays adequate comfort level or baseline comfort level: Encourage patient to monitor pain and request assistance

## 2024-04-10 NOTE — FLOWSHEET NOTE
04/10/24 1434   Pain Assessment   Pain Assessment 0-10   Pain Level 3   Patient's Stated Pain Goal 0 - No pain   Pain Location Abdomen;Pelvis   Pain Orientation Right   Pain Descriptors Aching;Cramping;Discomfort   Functional Pain Assessment Activities are not prevented   Pain Type Acute pain   Pain Frequency Intermittent   Pain Onset On-going   Non-Pharmaceutical Pain Intervention(s) Emotional support;Family support;Repositioned     Call placed to VERONICA Du CRNA to make aware of Pt request for pain relief. CRNA en route to bedside.

## 2024-04-10 NOTE — FLOWSHEET NOTE
Pt in OR at this time for unscheduled primary  section per Dr. Mena. This RN is to circulate during procedure.

## 2024-04-10 NOTE — FLOWSHEET NOTE
RN notes repetitive decelerations in FHR. RN to bedside and SVE performed at 1512, Pt cervix is 10/100/0. Call placed to Dr. Mena at 1518 to make aware of Pt complete with noted caput. Pt laboring down on a left tilt with peanut ball under her right hip at this time.

## 2024-04-10 NOTE — FLOWSHEET NOTE
Report received from Hilda MATA. This RN to assume care at this time. Pt reports 0/10 pain. Fundus remains firm u/u with scant bleeding at this time. Mom & Baby bonding well & skin to skin continues. RN remains in PACU with pt.

## 2024-04-10 NOTE — FLOWSHEET NOTE
Decision for primary  section per Dr. Mena for arrest of descent. MD at bedside discussing procedure with Pt.

## 2024-04-10 NOTE — FLOWSHEET NOTE
Call placed to VERONICA Du CRNA at 0734 to make aware of Pt wanting epidural for C/O 6/10 pain with contractions. CRNA en route to bedside. Pt up to the restroom to void and IV fluid bolus started. At 0740 Pt experienced a large gush of clear fluids. Pt assisted with marcio care and assisted back to the side of the bed. RN remains at Pt bedside. At 0752 Pt set up on the side of the bed for epidural insertion, CRNA at bedside. Epidural catheter placed and test dose performed at 0757. Epidural catheter secured and Pt assisted onto a right tilt at 0804.

## 2024-04-10 NOTE — ANESTHESIA POSTPROCEDURE EVALUATION
Department of Anesthesiology  Postprocedure Note    Patient: Dara Medina  MRN: [id number]  YOB: 1995  Date of evaluation: 4/10/2024    Procedure Summary       Date: 04/10/24 Room / Location:     Anesthesia Start: 0756 Anesthesia Stop: 1800    Procedure: Labor Analgesia Diagnosis:     Scheduled Providers:  Responsible Provider: Holly Oakes MD    Anesthesia Type: epidural ASA Status: 2            Anesthesia Type: No value filed.    Omnika Phase I:      Monika Phase II:      Anesthesia Post Evaluation    Patient location during evaluation: PACU  Patient participation: complete - patient participated  Level of consciousness: awake and alert  Pain score: 0  Nausea & Vomiting: no nausea and no vomiting  Cardiovascular status: hemodynamically stable  Respiratory status: acceptable  Hydration status: stable  Pain management: adequate        No notable events documented.

## 2024-04-10 NOTE — FLOWSHEET NOTE
RN at bedside to introduce self to Pt and FOB. Pt & FOB updated on POC and all questions answered. Pt states that she is coping through her contractions well at this time, RN notes Pt breathing well through contractions and is able to rest in-between contractions.  Pt states that she may want her epidural prior to rupture of membranes, RN will update Dr. Mena on Pt request upon arrival to unit. Pt denies any additional needs at this time.

## 2024-04-10 NOTE — FLOWSHEET NOTE
Procedure consent signed, witnessed and verified. Pre-op medications administered as ordered. Hair clipped and skin cleansed at incision site with chlorhexidine wipes. IUPC removed. Pt to OR at this time. All Pt questions answered. RN remains at bedside and is to circulate during procedure.

## 2024-04-10 NOTE — FLOWSHEET NOTE
RN at bedside. Pt ambulatory to bathroom & voided w/o difficulties & able to ambulate back to bed. NATALIIAE performed pt FT/60/-3. Plan of care discussed. Second dose of ordered Cytotec placed. Pt tolerated well. All questions answered at this time. Call light and bedside table  placed within reach. Instructed to call with any questions, needs, or concerns.

## 2024-04-10 NOTE — FLOWSHEET NOTE
04/09/24 1930   Fetal Heart Rate   Mode External US   Baseline Rate 135 bpm   Baseline Classification Normal   Variability 6-25 BPM   Pattern Accelerations   Patient Feels Fetal Movement Yes   Interventions Ultrasound Adjusted;[place] Adjusted;RN at Bedside   OB Bladder Status Non-distended;Voiding   OB Bladder Intervention Voiding with Relief   Multiple birth? No   Fetal Monitoring Strip   FMS Reviewed? Yes   FMS Reviewed By? sp   Uterine Activity   UA Mode Palpation;[place]   Contraction Frequency N/A   Contraction Duration N/A   Contraction Intensity N/A   Resting Tone Palpated Soft

## 2024-04-10 NOTE — FLOWSHEET NOTE
RN at bedside. SVE performed. 1/60/-3  Pt tolerated well. Pt up and ambulatory to bathroom and voided w/o difficulties. Pt ambulatory back to bed. Call light and bedside table remain within reach   [facility] on unit out of stock with Cytotec. Pharmacy called & notified by RN. RN told Pre-Split Cytotec will be tubed up to unit as soon as possible. Pt notified of plan of care. All questions answered at this time.

## 2024-04-11 ENCOUNTER — ANESTHESIA EVENT (OUTPATIENT)
Dept: LABOR AND DELIVERY | Age: 29
End: 2024-04-11
Payer: COMMERCIAL

## 2024-04-11 LAB
DEPRECATED RDW RBC AUTO: 13.6 % (ref 12.4–15.4)
HCT VFR BLD AUTO: 31.1 % (ref 36–48)
HGB BLD-MCNC: 10.3 G/DL (ref 12–16)
MCH RBC QN AUTO: 30.1 PG (ref 26–34)
MCHC RBC AUTO-ENTMCNC: 33.1 G/DL (ref 31–36)
MCV RBC AUTO: 91.1 FL (ref 80–100)
PLATELET # BLD AUTO: 157 K/UL (ref 135–450)
PMV BLD AUTO: 9.1 FL (ref 5–10.5)
RBC # BLD AUTO: 3.41 M/UL (ref 4–5.2)
WBC # BLD AUTO: 13.9 K/UL (ref 4–11)

## 2024-04-11 PROCEDURE — 6360000002 HC RX W HCPCS: Performed by: OBSTETRICS & GYNECOLOGY

## 2024-04-11 PROCEDURE — 1220000000 HC SEMI PRIVATE OB R&B

## 2024-04-11 PROCEDURE — 6370000000 HC RX 637 (ALT 250 FOR IP): Performed by: OBSTETRICS & GYNECOLOGY

## 2024-04-11 PROCEDURE — 85027 COMPLETE CBC AUTOMATED: CPT

## 2024-04-11 PROCEDURE — 36415 COLL VENOUS BLD VENIPUNCTURE: CPT

## 2024-04-11 RX ORDER — IBUPROFEN 800 MG/1
800 TABLET ORAL EVERY 8 HOURS
Status: DISCONTINUED | OUTPATIENT
Start: 2024-04-11 | End: 2024-04-13 | Stop reason: HOSPADM

## 2024-04-11 RX ORDER — IBUPROFEN 800 MG/1
800 TABLET ORAL EVERY 8 HOURS
Status: DISCONTINUED | OUTPATIENT
Start: 2024-04-12 | End: 2024-04-11

## 2024-04-11 RX ORDER — KETOROLAC TROMETHAMINE 30 MG/ML
30 INJECTION, SOLUTION INTRAMUSCULAR; INTRAVENOUS EVERY 8 HOURS
Status: DISPENSED | OUTPATIENT
Start: 2024-04-11 | End: 2024-04-12

## 2024-04-11 RX ORDER — ACETAMINOPHEN 500 MG
1000 TABLET ORAL EVERY 8 HOURS
Status: DISCONTINUED | OUTPATIENT
Start: 2024-04-12 | End: 2024-04-13 | Stop reason: HOSPADM

## 2024-04-11 RX ADMIN — KETOROLAC TROMETHAMINE 30 MG: 30 INJECTION, SOLUTION INTRAMUSCULAR at 05:42

## 2024-04-11 RX ADMIN — DOCUSATE SODIUM 100 MG: 100 CAPSULE, LIQUID FILLED ORAL at 10:48

## 2024-04-11 RX ADMIN — DOCUSATE SODIUM 100 MG: 100 CAPSULE, LIQUID FILLED ORAL at 20:25

## 2024-04-11 RX ADMIN — IBUPROFEN 800 MG: 800 TABLET, FILM COATED ORAL at 21:10

## 2024-04-11 RX ADMIN — KETOROLAC TROMETHAMINE 30 MG: 30 INJECTION, SOLUTION INTRAMUSCULAR at 13:10

## 2024-04-11 ASSESSMENT — PAIN SCALES - GENERAL
PAINLEVEL_OUTOF10: 0
PAINLEVEL_OUTOF10: 2
PAINLEVEL_OUTOF10: 2
PAINLEVEL_OUTOF10: 0
PAINLEVEL_OUTOF10: 3
PAINLEVEL_OUTOF10: 2

## 2024-04-11 ASSESSMENT — PAIN DESCRIPTION - ORIENTATION
ORIENTATION: LOWER

## 2024-04-11 ASSESSMENT — PAIN DESCRIPTION - LOCATION
LOCATION: ABDOMEN;INCISION
LOCATION: ABDOMEN

## 2024-04-11 ASSESSMENT — PAIN DESCRIPTION - FREQUENCY
FREQUENCY: CONTINUOUS

## 2024-04-11 ASSESSMENT — PAIN DESCRIPTION - DESCRIPTORS
DESCRIPTORS: SORE
DESCRIPTORS: ACHING;DISCOMFORT

## 2024-04-11 ASSESSMENT — PAIN DESCRIPTION - PAIN TYPE
TYPE: SURGICAL PAIN

## 2024-04-11 ASSESSMENT — PAIN DESCRIPTION - ONSET
ONSET: ON-GOING

## 2024-04-11 ASSESSMENT — PAIN - FUNCTIONAL ASSESSMENT
PAIN_FUNCTIONAL_ASSESSMENT: ACTIVITIES ARE NOT PREVENTED

## 2024-04-11 NOTE — PROGRESS NOTES
Department of Obstetrics and Gynecology   Postpartum Rounds    SUBJECTIVE:  Pain is controlled with non-steroidal anti-inflammatory drugs or narcotic analgesics. The patient is tolerating regular diet. She is ambulating. Her lochia is normal.    OBJECTIVE:  Vital Signs: /77   Pulse 82   Temp 98 °F (36.7 °C) (Oral)   Resp 16   Ht 1.588 m (5' 2.5\")   Wt 94.3 kg (208 lb)   SpO2 95%   Breastfeeding Unknown   BMI 37.44 kg/m²   Appearance/Psychiatric: awake, alert, cooperative, no apparent distress, appears stated age  Constitutional: The patient is well nourished.  Cardiovascular: She does not have edema.  Respiratory: Respiratory effort is normal.  Gastrointestinal: Soft, appropriately tender, uterine fundus is firm below umbilicus  The incision is covered with clean silver dressing  Extremities: nontender to palpation    LABS / IMAGING:  CBC:   Lab Results   Component Value Date/Time    WBC 13.9 2024 08:42 AM    RBC 3.41 2024 08:42 AM    HGB 10.3 2024 08:42 AM    HCT 31.1 2024 08:42 AM    MCV 91.1 2024 08:42 AM    MCH 30.1 2024 08:42 AM    MCHC 33.1 2024 08:42 AM    RDW 13.6 2024 08:42 AM     2024 08:42 AM    MPV 9.1 2024 08:42 AM       ASSESSMENT:    Postoperative Day 1 s/p  Unplanned PLTCS for arrest of descent      PLAN:   1. Advance postoperative care as tolerated  2. Male infant, breast feeding.  3. Discharge home on Postpartum Day 3  4. Return to office in 6 weeks   5. Postpartum instructions reviewed and all patient's Questions answered    Electronically signed by Lee Ann Baum MD on 2024 at 2:02 PM

## 2024-04-11 NOTE — FLOWSHEET NOTE
FOB/ Barry has severe diagnosed anxiety. RN notes Barry to be incongruently anxious regarding both maternal & fetal care and general well- being, continually asking \" Is (he/she) okay? Is (he/she) going to die?\" RN placed a social work consult so that Pt & FOB may have additonal support and availability to resources if needed when they return home.

## 2024-04-11 NOTE — PLAN OF CARE
Problem: Vaginal Birth or  Section  Goal: Fetal and maternal status remain reassuring during the birth process  Description:  Birth OB-Pregnancy care plan goal which identifies if the fetal and maternal status remain reassuring during the birth process  Outcome: Completed     Problem: Pain  Goal: Verbalizes/displays adequate comfort level or baseline comfort level  Outcome: Progressing  Flowsheets  Taken 2024 0355  Verbalizes/displays adequate comfort level or baseline comfort level: Encourage patient to monitor pain and request assistance  Taken 4/10/2024 2351  Verbalizes/displays adequate comfort level or baseline comfort level: Encourage patient to monitor pain and request assistance  Taken 4/10/2024 2000  Verbalizes/displays adequate comfort level or baseline comfort level: Encourage patient to monitor pain and request assistance     Problem: Safety - Adult  Goal: Free from fall injury  Outcome: Progressing     Problem: Discharge Planning  Goal: Discharge to home or other facility with appropriate resources  Outcome: Progressing

## 2024-04-11 NOTE — ANESTHESIA POSTPROCEDURE EVALUATION
Department of Anesthesiology  Postprocedure Note    Patient: Dara Medina  MRN: [id number]  YOB: 1995  Date of evaluation: 2024    Procedure Summary       Date: 04/10/24 Room / Location: [facility]    Anesthesia Start: 0756 Anesthesia Stop: 1800    Procedures:       PRIMARY  SECTION WITH LOW TRANSVERSE UTERINE INCISION @ 1727 (Abdomen/Perineum)      Labor Analgesia Diagnosis: (Arrest of Descent)    Surgeons: Nupur Mena MD Responsible Provider: Holly Oakes MD    Anesthesia Type: epidural ASA Status: 2            Anesthesia Type: Epidural    Monika Phase I: Monika Score: 10    Monika Phase II: Monika Score: 9    Anesthesia Post Evaluation    Patient location during evaluation: bedside  Patient participation: complete - patient participated  Level of consciousness: awake and alert  Pain score: 2  Airway patency: patent  Nausea & Vomiting: no nausea and no vomiting  Cardiovascular status: hemodynamically stable  Respiratory status: room air  Hydration status: stable  Multimodal analgesia pain management approach (Tylenol/Motrin)  Pain management: adequate and satisfactory to patient      [facility] Department of Anesthesiology  Post-Anesthesia Note       Name:  Dara Medina                                         Age:  28 y.o.  MRN:  [id number]   39w3d      Dara Medina was evaluated on postpartum day 1.  She expresses no concerns regarding her anesthesia care at this time.        Last Vitals & Oxygen Saturation: /77   Pulse 82   Temp 36.7 °C (98 °F) (Oral)   Resp 16   Ht 1.588 m (5' 2.5\")   Wt 94.3 kg (208 lb)   SpO2 95%   Breastfeeding Unknown   BMI 37.44 kg/m²       Anesthesia: epidural    Level of consciousness: awake, alert, and oriented    Respiratory: stable     Cardiovascular: stable     Hydration: stable     PONV:  none    Pruritis: none    Post Dural Puncture Headache: denies    Post-op pain: adequate analgesia    Out of bed

## 2024-04-11 NOTE — FLOWSHEET NOTE
RN at bedside. Marcio care performed. Vincent cath removed. 500ml of micah yellow urine drained. Ice pack, marcio pad & panties. New gown brought to bedside- pt declined need for new gown. Gown left in pt's bathroom. Pt up and ambulated with stand by assist to chair. Pt tolerated well. Morning dose of Toradol given. Abdominal binder offered & at bedside- pt declined application at this time. New chux pad and blanket applied to pt's bed. Call light and bedside table placed within reach. Instructed to call with any questions, needs, or concerns.

## 2024-04-11 NOTE — LACTATION NOTE
This note was copied from a baby's chart.  Lactation Consult Note    Data: Consult received and appreciated. KASHMIR reviewed chart and spoke with Farida MATA.    Maternal History: denies    OB/Delivery Risks for Lactation: IOL, first baby, few elevated b/p's since birth    Breast pump for home use: Spectra S2    Action: LC to room. Introduced self and lactation services. Mother agreeable to consult at this time.  Mother resting in bed. Infant sleeping, swaddled in father's arms, showing no hunger cues at this time. Mother states it has been 3 hours since infant last fed, agreeable to LC assist with feeding session.  LC performed initial  assessment, including oral exam. Infant did not seem interested in sucking on LC's gloved finger. When LC explained this to parents, dad asked \"Is he going to die?\"  LC explained normal  behaviors and instincts and that stimulating his palate should elicit a sucking response. With continued stimulation, infant did initiate sucking but was disorganized at first. After a short sucking burst, his suck did become stronger but he also pushed LC's gloved finger out of his mouth.  When asked, dad shared he was diagnosed with anxiety and takes Prozac for it. Dad shared he needs to take his morning dose so LC encouraged him to do so at this time. D/w parents that infants may feel anxious from parents so encouraged them to maintain a calming environment/presence for him.   Infant placed skin to skin with mother and he immediately calmed. LC reviewed Care Plan for First 24 Hours of Life.  Discussed recognizing hunger cues and offering the breast when cues are shown. Encouraged breastfeeding on demand and attempting/offering at least every 3 hours. Encouraged unlimited skin to skin contact with infant and reviewed benefits including better temperature, heart rate, respiration, blood pressure, and blood sugar regulation. Also increased bonding and milk supply associated with skin to  skin contact.   Reviewed feeding positions and latch on techniques. Encouraged lining nose to nipple, holding belly to belly and waiting for wide open mouth before quickly bringing infant onto breast. Reviewed importance of obtaining a deep latch to ensure milk transfer, establish milk production, maintain milk supply and improve maternal comfort.  Reviewed  signs of milk transfer, output goals and normal feeding/sleeping behaviors for the first 24 hours and beyond.    With mother's permission, I performed a breast exam and found normal anatomy and sufficient glandular tissue for breastfeeding.  Nipples are flat, but protrude with stimulation. Nipples retract with compression so taught mother corner latch.  Valencia Pham at bedside at this time for  assessment.  Infant placed back in skin to skin contact when he was finished and he began showing hunger cues.  LC repositioned to football hold on left side. LC easily hand expressed many drops of colostrum to coax infant to latch. He would latch but not suck, fall asleep, then pull off fussy. He did this for about 10-15 minutes before stopping to take a break and he was placed back on mom's chest for skin contact.  Mother then tried to latch him to right side in football hold. He was very fussy and did not attempt to latch on despite mother hand expressing drops of colostrum to him. Infant fussy and gassy and passing gas so attempt was stopped and he was placed back in skin contact for calming. He did not latch on to pacifier either when it was offered several different times. D/w parents he does not seem hungry at this time and may be fussy due to gas. Reassurance provided.   Reviewed feeding plan/goals. D/w mother when to supplement and when to pump, etc.   LC encouraged unlimited skin contact and attempting when infant shows cues for now. If mother decides to supplement for any reason, encouraged the initiation of pumping and explained rationale. Answered many

## 2024-04-11 NOTE — FLOWSHEET NOTE
Recovery ended, patient doing well. VSS. Fundus remains firm, bleeding WNL. Incision site CDI. Vincent draining clear yellow urine.  IV infusing without trouble.  Hetal care performed, pads and gown changed. SCD's remain on. Pt moved to room 2257. Postpartum education given, whiteboard updated, call light at bedside. IS placed at bedside-education given and demonstrated back to RN.  remains at bedside. Pt without needs at this time.Instructed to call with any questions, needs, or concerns.

## 2024-04-11 NOTE — FLOWSHEET NOTE
Morning assessment completed at bedside. VSS. Fundus firm at midline.  Pt with minimal bleeding.  Incision site CD&I  Pt eating & drinking well on own. Vincent remains in place.  Pain controlled at this time with Scheduled & PRN pain meds. Safe sleep & routine postpartum care reviewed with MOB & FOB.   Mom without questions at this time. Discussed plan of care with pt and FOB. Educated and encouraged skin to skin when attempting to breastfeed. Call light & bedside table within reach. No needs at this time. Encouraged pt to call with any needs.

## 2024-04-12 PROBLEM — Z98.890 STATUS POST INDUCTION OF LABOR: Status: RESOLVED | Noted: 2024-04-09 | Resolved: 2024-04-12

## 2024-04-12 PROCEDURE — 6370000000 HC RX 637 (ALT 250 FOR IP): Performed by: OBSTETRICS & GYNECOLOGY

## 2024-04-12 PROCEDURE — 6370000000 HC RX 637 (ALT 250 FOR IP): Performed by: ANESTHESIOLOGY

## 2024-04-12 PROCEDURE — 1220000000 HC SEMI PRIVATE OB R&B

## 2024-04-12 RX ORDER — OXYCODONE HYDROCHLORIDE 5 MG/1
5 TABLET ORAL EVERY 6 HOURS PRN
Qty: 10 TABLET | Refills: 0 | Status: SHIPPED | OUTPATIENT
Start: 2024-04-12 | End: 2024-04-19

## 2024-04-12 RX ADMIN — DOCUSATE SODIUM 100 MG: 100 CAPSULE, LIQUID FILLED ORAL at 20:15

## 2024-04-12 RX ADMIN — DOCUSATE SODIUM 100 MG: 100 CAPSULE, LIQUID FILLED ORAL at 10:39

## 2024-04-12 RX ADMIN — IBUPROFEN 800 MG: 800 TABLET, FILM COATED ORAL at 15:01

## 2024-04-12 RX ADMIN — ACETAMINOPHEN 1000 MG: 500 TABLET ORAL at 18:03

## 2024-04-12 RX ADMIN — ACETAMINOPHEN 1000 MG: 500 TABLET ORAL at 10:38

## 2024-04-12 RX ADMIN — ACETAMINOPHEN 1000 MG: 500 TABLET ORAL at 00:18

## 2024-04-12 ASSESSMENT — PAIN DESCRIPTION - ORIENTATION
ORIENTATION: LOWER

## 2024-04-12 ASSESSMENT — PAIN - FUNCTIONAL ASSESSMENT
PAIN_FUNCTIONAL_ASSESSMENT: ACTIVITIES ARE NOT PREVENTED

## 2024-04-12 ASSESSMENT — PAIN DESCRIPTION - DESCRIPTORS
DESCRIPTORS: CRAMPING;DISCOMFORT
DESCRIPTORS: CRAMPING;DISCOMFORT
DESCRIPTORS: ACHING;DISCOMFORT
DESCRIPTORS: ACHING;DISCOMFORT

## 2024-04-12 ASSESSMENT — PAIN DESCRIPTION - LOCATION
LOCATION: ABDOMEN;INCISION
LOCATION: ABDOMEN

## 2024-04-12 ASSESSMENT — PAIN SCALES - GENERAL
PAINLEVEL_OUTOF10: 2
PAINLEVEL_OUTOF10: 3
PAINLEVEL_OUTOF10: 2
PAINLEVEL_OUTOF10: 4
PAINLEVEL_OUTOF10: 3

## 2024-04-12 ASSESSMENT — PAIN DESCRIPTION - RADICULAR PAIN
RADICULAR_PAIN: ABSENT
RADICULAR_PAIN: ABSENT

## 2024-04-12 ASSESSMENT — PAIN DESCRIPTION - ONSET
ONSET: ON-GOING

## 2024-04-12 ASSESSMENT — PAIN DESCRIPTION - PAIN TYPE
TYPE: SURGICAL PAIN

## 2024-04-12 ASSESSMENT — PAIN DESCRIPTION - FREQUENCY
FREQUENCY: CONTINUOUS

## 2024-04-12 NOTE — LACTATION NOTE
This note was copied from a baby's chart.  Lactation Progress Note    Data: Follow up. KASHMIR reviewed chart and spoke with Farida MATA.    Action: LC to room. Mother resting in bed. Infant sleeping, swaddled in bassinet, showing no hunger cues at this time.  Parents states they had a rough night and did not get any sleep. Mother states she tried a nipple shield without luck. Infant last fed at 0300 and mother pumped 5 mL colostrum which was syringe fed to infant and he tolerated well.  Plan of care reviewed with parents. Options discussed. Decision made for infant to go to respite nursery for parents to sleep. Farida MATA escorted infant to nursery for bottle/formula feed per parent's request.  KASHMIR assisted mother with pumping using symphony pump. Observed mother using size 21 mm flanges which were an appropriate fit and mother states are comfortable. Mother obtained small puddle of colostrum which can be used as supplement after next feeding. Reviewed cleaning, labeling and milk storage guidelines.   Plan is to limit visitors for the day to allow family to rest.  Infant will return for next feeding ~ 1030.  Answered all questions mother asked. Encouraged to eat breakfast and then sleep and she agreed.    Updated bedside RN.  Updated Dr. Birmingham on plan and feeding status.    Response:  Mother verbalizes understanding of information given and denies further needs at this time.      Dara DALEYN, RN, IBCLC  Lactation Consultant

## 2024-04-12 NOTE — FLOWSHEET NOTE
Infant returned to mother and father in postpartum room 2253 ID bands checked and confirmed upon arrival to room. Infant placed skin to skin with mother. KUMAR Reaves RN, IBCLC at bedside to assist with breastfeeding.

## 2024-04-12 NOTE — PLAN OF CARE
Problem: Pain  Goal: Verbalizes/displays adequate comfort level or baseline comfort level  4/12/2024 1731 by Farida Branch RN  Outcome: Progressing  4/12/2024 0707 by Nancy Dobsno RN  Outcome: Progressing  Flowsheets (Taken 4/12/2024 0018)  Verbalizes/displays adequate comfort level or baseline comfort level:   Encourage patient to monitor pain and request assistance   Assess pain using appropriate pain scale   Administer analgesics based on type and severity of pain and evaluate response     Problem: Infection - Adult  Goal: Absence of infection at discharge  4/12/2024 1731 by Farida Branch RN  Outcome: Progressing  4/12/2024 0707 by Nancy Dobson RN  Outcome: Progressing  Flowsheets (Taken 4/12/2024 0018)  Absence of infection at discharge:   Assess and monitor for signs and symptoms of infection   Monitor lab/diagnostic results   Monitor all insertion sites i.e., indwelling lines, tubes and drains  Goal: Absence of infection during hospitalization  4/12/2024 1731 by Farida Branch RN  Outcome: Progressing  4/12/2024 0707 by Nancy Dobson RN  Outcome: Progressing  Flowsheets (Taken 4/12/2024 0018)  Absence of infection during hospitalization:   Assess and monitor for signs and symptoms of infection   Monitor lab/diagnostic results   Monitor all insertion sites i.e., indwelling lines, tubes and drains  Goal: Absence of fever/infection during anticipated neutropenic period  4/12/2024 1731 by Farida Branch RN  Outcome: Progressing  4/12/2024 0707 by Nancy Dobson RN  Outcome: Progressing  Flowsheets (Taken 4/12/2024 0018)  Absence of fever/infection during anticipated neutropenic period:   Monitor white blood cell count   Administer growth factors as ordered   Implement neutropenic guidelines     Problem: Safety - Adult  Goal: Free from fall injury  4/12/2024 1731 by Farida Branch RN  Outcome: Progressing  4/12/2024 0707 by Nancy Dobson RN  Outcome: Progressing     Problem: Discharge  Planning  Goal: Discharge to home or other facility with appropriate resources  4/12/2024 1731 by Farida Branch, RN  Outcome: Progressing  4/12/2024 0707 by Nancy Dobson RN  Outcome: Progressing  Flowsheets (Taken 4/12/2024 0018)  Discharge to home or other facility with appropriate resources:   Identify barriers to discharge with patient and caregiver   Arrange for needed discharge resources and transportation as appropriate     Problem: Chronic Conditions and Co-morbidities  Goal: Patient's chronic conditions and co-morbidity symptoms are monitored and maintained or improved  4/12/2024 1731 by Farida Branch, RN  Outcome: Progressing  4/12/2024 0707 by Nancy Dobson RN  Outcome: Progressing  Flowsheets (Taken 4/12/2024 0018)  Care Plan - Patient's Chronic Conditions and Co-Morbidity Symptoms are Monitored and Maintained or Improved:   Monitor and assess patient's chronic conditions and comorbid symptoms for stability, deterioration, or improvement   Collaborate with multidisciplinary team to address chronic and comorbid conditions and prevent exacerbation or deterioration

## 2024-04-12 NOTE — LACTATION NOTE
This note was copied from a baby's chart.  Lactation Progress Note    Data: Follow up. Farida MATA states infant awake and skin to skin with mother.    Action: LC to room. Mother resting in bed, holding infant in skin contact, attempting to latch him in football hold to left side. Infant fussy so mother removed him upon LC entering room.  Mother attempted to calm him with skin to skin, offering breaks/burps, and pacifier.   When he calmed, she repositioned him back to left breast in football hold. Mother easily hand expressing milk to coax him to latch. He latched on after several attempts with LC assist and sustained active sucking with several audible swallows fr ~ 5 minutes before self detaching and becoming fussy again.  Infant then given to Farida MATA and FOB to offer supplement. RN taught paced bottle feeding methods, offering breaks/burps etc.  LC assisted mother with pumping session. ~ 5 mL obtained but mother spilled some so only 3 mL remained in bottle.   Reviewed cleaning, labeling and milk storage guidelines.   Answered all questions parents asked, supported their efforts. Encouraged mother to take a nap when able. Reinforced importance of rest, hydration and good nutrition.   Next feeding to be at 1700 unless infant wakes and shows hunger cues sooner.   Farida MATA updated.    Response:  Mother verbalizes understanding of information given and denies further needs at this time.      Dara GOMEZ, RN, IBCLC  Lactation Consultant

## 2024-04-12 NOTE — DISCHARGE SUMMARY
Department of Obstetrics and Gynecology  Postpartum Discharge Summary      Admit Date: 2024    Admit Diagnosis: Status post induction of labor [Z98.890]    Discharge Date:   24    Discharge Diagnoses: primary C section       Medication List        ASK your doctor about these medications      PRENATAL VITAMIN PO              Service: Obstetrics    Consults: none    Significant Diagnostic Studies: none    Postpartum complications: none     Condition at Discharge: good    Hospital Course: uncomplicated    +flatus, felipa reg diet  Soft nt nd; dressing c/d    Discharge Instructions:     Activity: no sex for 6 weeks, no driving while on analgesics, no heavy lifting for 6 weeks, and as tolerated    Diet: regular diet    Instructions: No intercourse and nothing in the vagina for 6 weeks. Do not drive while using pain medications. Keep any wounds clean and dry    Discharge to: Home    Disposition / Follow up: Return to office in 6 weeks    Home Health Nurse visit within 24-48 h if qualifies     Data:  Apgars:  Information for the patient's :  Aaron Medina [[id number]]   APGAR One: 9   Information for the patient's :  Aaron Medina [[id number]]   APGAR Five: 9   Birth Weight:  Information for the patient's :  Aaron Medina [[id number]]   Birth Weight: 3.72 kg (8 lb 3.2 oz)   Home with mother    Electronically signed by SARAH FLESCH SABIN, MD on 2024 at 1:14 PM

## 2024-04-12 NOTE — CARE COORDINATION
Case Management Mom/Baby Assessment    Identifying Information    Mother of Baby: Dara Medina  Mother's :1994    Father of Baby:Barry Medina   Father's : 3/10/1995    Baby's Name: James  Delivery Date:4/10/24   Baby's Weight:  8lbs 3.2 oz    Apgar: 9/9  Nursing concerns for baby:FOB has a lot of anxiety  Week Gestation: 39 weeks and 3 days  [facility] Care:[facility]  Baby's Urine or Cord Drug Screen Yes___  No_x__ Results____  PCP for baby: [facility]- Dr Donn Fernandez  Date of appt:   4/15/24      Time of Appt: 11am    Reasoning for Referral: FOB has anxiety regarding baby and MOB.      Assessment Information    Discharge Address:[address] [place] [address]  Phone:[phone]    Resides with:MOB and FOB live together with their 3 dogs and now (  2021)    Emergency Contact:FOB Phone:[phone]    Support System:parents on both sides and other family members.     Other Children- None    Custody:MOB and FOB have custody of     Father of Baby Involvement:Yes     Have you ever had contact with Children's Services (describe):No    Supplies: Has all needed supplies  Car Seat  Diapers  Crib/Bassinet  Feeding  [place]    Resources: No resources needed.  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    History   Domestic Abuse:no  Physical Abuse: no  Sexual Abuse:no  Drug Abuse/Prescription Medication:no  Depression:no  Medication/Counseling: HIPOLITO reports that she has ADD and hasn't been medicated due to her pregnancy. FOB has ADD and Anxiety- this is managed by his PCP, MOB is aware of FOB anxiety and has been helping him to deal with it for years.   Does HIPOLITO have Medical Marijuana card? no  Any Guns or Animals in the home? No guns or weapons in

## 2024-04-12 NOTE — FLOWSHEET NOTE
RN to bedside, Mother & Father of baby state that they are exhausted, that feedings have not been going well, and that infant is continually fussy and hard to console. FEI Reddy states \"I have been taking my ADHD medications to help me stay awake all night\". RN discussed coping measures and importance of creating a schedule during the night so that they may receive some rest with parents. Both parents verbalized understanding. RN discussed goals for the day and POC with both parents. Mother requesting for infant to be taken to SCN with RN at this time and fed formula as supplementation. KUMAR Reaves RN, IBCLC to bedside to assist mother with pumping. Mother and father are to get some rest while infant receives care in SCN. Infant is return to parents in postpartum room 2257 at ~1030.    numerical 0-10

## 2024-04-12 NOTE — PLAN OF CARE
Problem: Pain  Goal: Verbalizes/displays adequate comfort level or baseline comfort level  Outcome: Progressing  Flowsheets (Taken 4/12/2024 0018)  Verbalizes/displays adequate comfort level or baseline comfort level:   Encourage patient to monitor pain and request assistance   Assess pain using appropriate pain scale   Administer analgesics based on type and severity of pain and evaluate response     Problem: Infection - Adult  Goal: Absence of infection at discharge  Outcome: Progressing  Flowsheets (Taken 4/12/2024 0018)  Absence of infection at discharge:   Assess and monitor for signs and symptoms of infection   Monitor lab/diagnostic results   Monitor all insertion sites i.e., indwelling lines, tubes and drains  Goal: Absence of infection during hospitalization  Outcome: Progressing  Flowsheets (Taken 4/12/2024 0018)  Absence of infection during hospitalization:   Assess and monitor for signs and symptoms of infection   Monitor lab/diagnostic results   Monitor all insertion sites i.e., indwelling lines, tubes and drains  Goal: Absence of fever/infection during anticipated neutropenic period  Outcome: Progressing  Flowsheets (Taken 4/12/2024 0018)  Absence of fever/infection during anticipated neutropenic period:   Monitor white blood cell count   Administer growth factors as ordered   Implement neutropenic guidelines     Problem: Safety - Adult  Goal: Free from fall injury  Outcome: Progressing     Problem: Discharge Planning  Goal: Discharge to home or other facility with appropriate resources  Outcome: Progressing  Flowsheets (Taken 4/12/2024 0018)  Discharge to home or other facility with appropriate resources:   Identify barriers to discharge with patient and caregiver   Arrange for needed discharge resources and transportation as appropriate     Problem: Chronic Conditions and Co-morbidities  Goal: Patient's chronic conditions and co-morbidity symptoms are monitored and maintained or improved  Outcome:

## 2024-04-13 VITALS
BODY MASS INDEX: 36.86 KG/M2 | HEART RATE: 86 BPM | OXYGEN SATURATION: 98 % | TEMPERATURE: 98.2 F | RESPIRATION RATE: 18 BRPM | DIASTOLIC BLOOD PRESSURE: 71 MMHG | SYSTOLIC BLOOD PRESSURE: 130 MMHG | WEIGHT: 208 LBS | HEIGHT: 63 IN

## 2024-04-13 PROCEDURE — 6370000000 HC RX 637 (ALT 250 FOR IP): Performed by: ANESTHESIOLOGY

## 2024-04-13 PROCEDURE — 6370000000 HC RX 637 (ALT 250 FOR IP): Performed by: OBSTETRICS & GYNECOLOGY

## 2024-04-13 RX ADMIN — ACETAMINOPHEN 1000 MG: 500 TABLET ORAL at 08:43

## 2024-04-13 RX ADMIN — DOCUSATE SODIUM 100 MG: 100 CAPSULE, LIQUID FILLED ORAL at 08:43

## 2024-04-13 RX ADMIN — IBUPROFEN 800 MG: 800 TABLET, FILM COATED ORAL at 08:43

## 2024-04-13 RX ADMIN — ACETAMINOPHEN 1000 MG: 500 TABLET ORAL at 00:07

## 2024-04-13 RX ADMIN — IBUPROFEN 800 MG: 800 TABLET, FILM COATED ORAL at 00:06

## 2024-04-13 ASSESSMENT — PAIN SCALES - GENERAL
PAINLEVEL_OUTOF10: 3
PAINLEVEL_OUTOF10: 4

## 2024-04-13 ASSESSMENT — PAIN - FUNCTIONAL ASSESSMENT
PAIN_FUNCTIONAL_ASSESSMENT: ACTIVITIES ARE NOT PREVENTED
PAIN_FUNCTIONAL_ASSESSMENT: ACTIVITIES ARE NOT PREVENTED

## 2024-04-13 ASSESSMENT — PAIN DESCRIPTION - LOCATION
LOCATION: ABDOMEN
LOCATION: ABDOMEN

## 2024-04-13 ASSESSMENT — PAIN DESCRIPTION - ORIENTATION
ORIENTATION: LOWER
ORIENTATION: LOWER

## 2024-04-13 ASSESSMENT — PAIN DESCRIPTION - DESCRIPTORS
DESCRIPTORS: DISCOMFORT
DESCRIPTORS: DISCOMFORT

## 2024-04-13 ASSESSMENT — PAIN DESCRIPTION - RADICULAR PAIN: RADICULAR_PAIN: ABSENT

## 2024-04-13 NOTE — PLAN OF CARE
Problem: Pain  Goal: Verbalizes/displays adequate comfort level or baseline comfort level  Outcome: Adequate for Discharge  Flowsheets (Taken 4/13/2024 0030 by Kim Gilbert RN)  Verbalizes/displays adequate comfort level or baseline comfort level:   Encourage patient to monitor pain and request assistance   Assess pain using appropriate pain scale   Administer analgesics based on type and severity of pain and evaluate response   Implement non-pharmacological measures as appropriate and evaluate response   Notify Licensed Independent Practitioner if interventions unsuccessful or patient reports new pain   Consider cultural and social influences on pain and pain management     Problem: Infection - Adult  Goal: Absence of infection at discharge  Outcome: Adequate for Discharge  Flowsheets  Taken 4/13/2024 0843 by Mary Calero RN  Absence of infection at discharge:   Assess and monitor for signs and symptoms of infection   Monitor lab/diagnostic results  Taken 4/13/2024 0030 by Kim Gilbert RN  Absence of infection at discharge:   Assess and monitor for signs and symptoms of infection   Monitor lab/diagnostic results   Administer medications as ordered   Instruct and encourage patient and family to use good hand hygiene technique  Goal: Absence of infection during hospitalization  Outcome: Adequate for Discharge  Flowsheets  Taken 4/13/2024 0843 by Mary Calero RN  Absence of infection during hospitalization:   Assess and monitor for signs and symptoms of infection   Monitor lab/diagnostic results  Taken 4/13/2024 0030 by Kim Gilbert RN  Absence of infection during hospitalization:   Monitor lab/diagnostic results   Assess and monitor for signs and symptoms of infection   Administer medications as ordered   Instruct and encourage patient and family to use good hand hygiene technique  Goal: Absence of fever/infection during anticipated neutropenic period  Outcome: Adequate for

## 2024-04-13 NOTE — PLAN OF CARE
Problem: Pain  Goal: Verbalizes/displays adequate comfort level or baseline comfort level  4/12/2024 2043 by Kim Gilbert RN  Outcome: Progressing     Problem: Infection - Adult  Goal: Absence of infection at discharge  4/12/2024 2043 by Kim Gilbert RN  Outcome: Progressing     Problem: Infection - Adult  Goal: Absence of infection during hospitalization  4/12/2024 2043 by Kim Gilbert RN  Outcome: Progressing     Problem: Infection - Adult  Goal: Absence of fever/infection during anticipated neutropenic period  4/12/2024 2043 by Kim Gilbert RN  Outcome: Progressing     Problem: Safety - Adult  Goal: Free from fall injury  4/12/2024 2043 by Kim Gilbert RN  Outcome: Progressing     Problem: Discharge Planning  Goal: Discharge to home or other facility with appropriate resources  4/12/2024 2043 by Kim Gilbert RN  Outcome: Progressing     Problem: Chronic Conditions and Co-morbidities  Goal: Patient's chronic conditions and co-morbidity symptoms are monitored and maintained or improved  4/12/2024 2043 by Kim Gilbert RN  Outcome: Progressing

## 2024-04-13 NOTE — DISCHARGE INSTRUCTIONS
Follow-up with your OB doctor as specified in 6 weeks.        For Breastfeeding moms, you can contact our lactation specialists with any problems or questions you may have.  [facility] lactation [id number]      DIET  Eat a well balanced diet focusing on foods high in fiber and protein.   Drink plenty of fluids especially water.  To avoid constipation you may take a mild stool softener as recommended by your doctor or midwife.    ACTIVITY  Gradually increase your activity.  Resume exercise regimen only after advise by your doctor or midwife.  Avoid lifting anything heavier than your baby or a gallon of milk for SIX weeks.   Avoid driving until your doctor or midwife has given their approval.  Rise slowly from a lying to sitting and then a standing position.  Climb stairs one at a time.  Use caution when carrying your baby up and down the stairs.  NO SEXUAL Activity for 4-6 weeks or until advised by your doctor; Nothing in vagina: intercourse, tampons, or douching.   Be prepared to discuss family planning at your follow-up OB visit.   You may feel tired or have a lack of energy.  You may continue your prenatal vitamin to replenish nutrients post delivery.  Nap when baby naps to catch up on sleep.     EMOTIONS  You may feed cuevas, sad, teary, & overwhelmed.  Contact your OB provider if you feel you may be showing signs of postpartum depression, or have thoughts of harming yourself or your infant.  If infant will not stop crying, contact another adult for help or place infant in their crib on their back and take a break.  NEVER shake your infant.      BLEEDING  Vaginal bleeding will decrease in amount over the next few weeks.  You will notice that as your activity increases, your flow may increase.  This is your body's way of telling you, you need take things easier and rest more often.  Call your OB/ER if you are saturating more than one maxi pad in an hour & resting does help.    BREAST CARE  Take medications as  passing blood clots bigger than the size of a lemon.  If you are experiencing extreme weakness or dizziness.   If you are having flu-like symptoms such as achy muscles or joints.  There is a foul smell or a green color to your vaginal bleeding.  If you have pain that cannot be relieved.  You have persistent burning with urination or frequency.   Call if you have concerns about your well-being.  You are unable to sleep, eat, or are having thoughts of harming yourself or your baby.   You have swelling, bleeding, drainage, fould odor, redness, or warmth in/around your incision or stitches.  You have a red, warm, tender area in you calf.

## (undated) DEVICE — SUTURE VICRYL SZ 4-0 L27IN ABSRB UD L60MM KS STR REV CUT NDL J662H

## (undated) DEVICE — STRIP,CLOSURE,WOUND,MEDI-STRIP,1/2X4: Brand: MEDLINE

## (undated) DEVICE — Z INACTIVE NO ACTIVE SUPPLIER APPLICATOR MEDICATED 26 CC TINT HI-LITE ORNG STRL CHLORAPREP

## (undated) DEVICE — SAFESECURE,SECUREMENT,FOLEY CATH,STERILE: Brand: MEDLINE

## (undated) DEVICE — CATHETER TRAY 16 FR 5 CC FOL ANTIREFLX SAMPLING PRT DOVER

## (undated) DEVICE — SUTURE VICRYL SZ 3-0 L36IN ABSRB UD L36MM CT-1 1/2 CIR J944H

## (undated) DEVICE — DRESSING FOAM W4XL10IN AG SIL ADH ANTIMIC POSTOP OPTIFOAM

## (undated) DEVICE — GLOVE,SURG,SENSICARE SLT,LF,PF,7: Brand: MEDLINE

## (undated) DEVICE — SUTURE VICRYL SZ 0 L36IN ABSRB UD CT-1 L36MM 1/2 CIR TAPR PNT VCP946H

## (undated) DEVICE — Device

## (undated) DEVICE — POOLE SUCTION INSTRUMENT,RIGID: Brand: ARGYLE

## (undated) DEVICE — PAD, GROUNDING, UNIVERSAL, SPLIT, 9': Brand: MEDLINE

## (undated) DEVICE — SPONGE LAP W18XL18IN WHT COT 4 PLY FLD STRUNG RADPQ DISP ST 2 PER PACK

## (undated) DEVICE — COVER LT HNDL BLU PLAS

## (undated) DEVICE — BAG,SPONGE COUNTER,BLUE,50/BX,5BX/CS: Brand: MEDLINE

## (undated) DEVICE — 3M™ STERI-STRIP™ COMPOUND BENZOIN TINCTURE 40 BAGS/CARTON 4 CARTONS/CASE C1544: Brand: 3M™ STERI-STRIP™

## (undated) DEVICE — CANISTER, RIGID, 3000CC: Brand: MEDLINE INDUSTRIES, INC.

## (undated) DEVICE — SOLUTION IV IRRIG POUR BRL 0.9% SODIUM CHL 2F7124